# Patient Record
Sex: FEMALE | URBAN - METROPOLITAN AREA
[De-identification: names, ages, dates, MRNs, and addresses within clinical notes are randomized per-mention and may not be internally consistent; named-entity substitution may affect disease eponyms.]

---

## 2020-02-12 ENCOUNTER — NURSE TRIAGE (OUTPATIENT)
Dept: NURSING | Facility: CLINIC | Age: 85
End: 2020-02-12

## 2020-02-12 NOTE — TELEPHONE ENCOUNTER
Son Melvin ventura.  He was not with his mother at time of call.  He reports that he received a message from [facility] [place] to call them.    Advised caller that I'm not at clinic, nor do I have access to their electronic medical record, so I am not able to see who was calling him or why.  I inquired if his mom was having some medical concern that they were waiting to hear back from clinic about.  He said not to his knowledge.    Advised that if he finds out that his mom was urgently waiting to hear back from clinic about something or has an urgent medical concern, call us back, we are available 24/7 and we can page an on call provider for [facility].  Otherwise return clinic call in the morning.    Caller appears to understand directives and agrees with plan.    Aliya Thapa RN  [facility]